# Patient Record
Sex: MALE | Race: WHITE | NOT HISPANIC OR LATINO | Employment: FULL TIME | URBAN - METROPOLITAN AREA
[De-identification: names, ages, dates, MRNs, and addresses within clinical notes are randomized per-mention and may not be internally consistent; named-entity substitution may affect disease eponyms.]

---

## 2023-10-15 ENCOUNTER — HOSPITAL ENCOUNTER (EMERGENCY)
Facility: CLINIC | Age: 23
Discharge: HOME | End: 2023-10-15
Attending: EMERGENCY MEDICINE | Admitting: EMERGENCY MEDICINE
Payer: COMMERCIAL

## 2023-10-15 VITALS
DIASTOLIC BLOOD PRESSURE: 81 MMHG | OXYGEN SATURATION: 100 % | SYSTOLIC BLOOD PRESSURE: 124 MMHG | RESPIRATION RATE: 16 BRPM | HEART RATE: 90 BPM | TEMPERATURE: 98 F | WEIGHT: 195 LBS

## 2023-10-15 DIAGNOSIS — S01.81XA FACIAL LACERATION, INITIAL ENCOUNTER: ICD-10-CM

## 2023-10-15 DIAGNOSIS — S01.85XA DOG BITE OF FACE, INITIAL ENCOUNTER: ICD-10-CM
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
## 2023-10-15 PROCEDURE — 12052 INTMD RPR FACE/MM 2.6-5.0 CM: CPT | Performed by: EMERGENCY MEDICINE

## 2023-10-15 PROCEDURE — 99283 EMERGENCY DEPT VISIT LOW MDM: CPT | Mod: 25 | Performed by: EMERGENCY MEDICINE

## 2023-10-15 ASSESSMENT — ACTIVITIES OF DAILY LIVING (ADL): ADLS_ACUITY_SCORE: 35

## 2023-10-15 NOTE — ED TRIAGE NOTES
He was teasing his dog around it's food dish and it bit him on the L temple. It is a irregular 2 cm diameter cut and it's not bleeding

## 2023-10-15 NOTE — ED PROVIDER NOTES
History     Chief Complaint   Patient presents with    Dog Bite     HPI  Blake Cárdenas is a 23 year old male who presents to the emergency room for concern of dog bite.  Said that he was teasing the family dog and was playing around its food dish this morning when the dog bit him in the left temple.  He said that he does not have much pain.  Has an irregular cut on his left temple.  Bleeding is controlled.  Says that he is up-to-date on vaccinations, but no records in EMR.  The dog is up-to-date on vaccinations    Allergies:  No Known Allergies    Problem List:    There are no problems to display for this patient.       Past Medical History:    No past medical history on file.    Past Surgical History:    No past surgical history on file.    Family History:    No family history on file.    Social History:  Marital Status:  Single [1]        Medications:    amoxicillin-clavulanate (AUGMENTIN) 875-125 MG tablet          Review of Systems   All other systems reviewed and are negative.      Physical Exam   BP: 127/84  Pulse: 91  Temp: 98  F (36.7  C)  Resp: 18  Weight: 88.5 kg (195 lb)  SpO2: 100 %      Physical Exam  Vitals and nursing note reviewed.   Constitutional:       General: He is not in acute distress.  HENT:      Head: Normocephalic.      Nose: Nose normal.   Eyes:      Extraocular Movements: Extraocular movements intact.      Conjunctiva/sclera: Conjunctivae normal.      Pupils: Pupils are equal, round, and reactive to light.   Musculoskeletal:      Cervical back: Normal range of motion.   Skin:     Comments: See photo below   Neurological:      Mental Status: He is alert.             ED [facility]    -Laceration Repair    Date/Time: 10/15/2023 9:45 AM    Performed by: Grace Muñiz DO  Authorized by: Grace Muñiz DO    Risks, benefits and alternatives discussed.      ANESTHESIA (see MAR for exact dosages):     Anesthesia  method:  Local infiltration    Local anesthetic:  Bupivacaine 0.5% w/o epi  LACERATION DETAILS     Location:  Face    Length (cm):  4    Depth (mm):  2    REPAIR TYPE:     Repair type:  Complex    EXPLORATION:     Limited defect created (wound extended): yes      Hemostasis achieved with:  Direct pressure    TREATMENT:     Amount of cleaning:  Extensive    Irrigation solution:  Sterile saline    Irrigation method:  Syringe and pressure wash    Undermining:  Minimal    Scar revision: yes      FASCIA REPAIR     Suture size:  4-0    Suture material:  Vicryl    Suture technique:  Simple interrupted    Number of sutures:  5    SKIN REPAIR     Repair method:  Sutures    Suture size:  6-0    Suture material:  Prolene    Suture technique:  Simple interrupted    Number of sutures:  7    APPROXIMATION     Approximation:  Close    POST-PROCEDURE DETAILS     Dressing:  Antibiotic ointment and adhesive bandage      PROCEDURE    Patient Tolerance:  Patient tolerated the procedure well with no immediate complications                Critical Care time:  none               No results found for this or any previous visit (from the past 24 hour(s)).    Medications - No data to display    Assessments & Plan (with Medical Decision Making)  Blake is a 23-year-old male presenting to the emergency room over concern of dog bite to the face.  See history focused physical exam as above  Pleasant 23-year-old male in no acute distress, is vitally stable and afebrile.  He is up-to-date on childhood vaccinations, and should be within acceptable range for having received complete tetanus series.  Dog is a family pet and is up-to-date on vaccinations, no concern for rabies.  This was provoked bite.  RN reported to the [facility].  Discussed plan for repair, this will be more complex since this appears to be avulsed skin and may need to extend wound edges so that it will cleanly come together, and may need to do some undermining to improve scarring  and remove tension.  He is agreeable to this plan.  Local anesthetic was infiltrated to allow for more comfort during irrigation  RN copiously irrigated, washed the wound with soap and water, and irrigated again.  Wound was then prepped and draped in the usual sterile fashion.  Wound margins were extended with an elliptical incision.  Undermining to reduce tension on the wound and inverted interrupted Vicryl sutures were placed to reduce tension and closer approximate edges.  Then using Ethilon sutures, closely approximated wound edges.  See procedure note above for details.  Patient tolerated this well and wound was well approximated.  Bleeding was controlled.  Had a long discussion with patient and his father who is at the bedside for duration of procedure the necessary wound care and when to have sutures removed.  Father states that he has had numerous sutures over the years and feels comfortable removing them himself.  Did advise that animal bites have a high chance of becoming infected, and since patient will be on antibiotic and this is on his face, would recommend a primary doctor remove the sutures so they can take a second look, but if they feel comfortable and do not notice any sign of infection, they could try to remove sutures at home.  They were provided with the forceps and scissors to do so.  Wound care instructions were provided.  Antibiotic was sent to the pharmacy.  All questions were answered.  Discharged in stable condition     I have reviewed the nursing notes.    I have reviewed the findings, diagnosis, plan and need for follow up with the patient.           Medical Decision Making  The patient's presentation was of moderate complexity (an acute complicated injury).    The patient's evaluation involved:  history and exam without other MDM data elements    The patient's management necessitated moderate risk (prescription drug management including medications given in the ED) and moderate risk (a  decision regarding minor procedure (laceration repair) with risk factors of high risk features of the procedure site ).        Discharge Medication List as of 10/15/2023 11:27 AM        START taking these medications    Details   amoxicillin-clavulanate (AUGMENTIN) 875-125 MG tablet Take 1 tablet by mouth 2 times daily, Disp-20 tablet, R-0, E-Prescribe             Final diagnoses:   Dog bite of face, initial encounter   Facial laceration, initial encounter       10/15/2023   [facility] EMERGENCY DEPT       Grace Muñiz DO  10/15/23 0246

## 2023-10-15 NOTE — DISCHARGE INSTRUCTIONS
The wound from a dog bite had to be revised to improve scarring outcome.  You had 5 dissolvable stitches placed under your skin, and 7 stitches placed on top of your skin.  The 7 stitches on the outside will need to be removed in approximately 1 week.  You should follow-up with your primary doctor in clinic for evaluation of wound healing and to have the sutures removed.  However, if you are unable to see your provider and you feel comfortable removing the stitches yourself, you may use the tools provided to carefully step the sutures next to the knot and remove    Keep the bandage on for the next 24 hours.  After that, you remove and replace it at least once daily or if it becomes dirty or soiled.  May apply antibiotic ointment over the top to help it heal and keep it protected    After 24 hours, it is okay if wound gets wet while you are in the shower.  Okay of soap or water runs over it, but do not scrub so the sutures do not loosen.  Pat dry carefully    Start the antibiotic today.  Antibiotic is necessary to prevent infection from an animal bite    If you develop any signs of infection, such as pus draining from the wound, redness running around the area, fever, or any new concerns, do not hesitate to return to the emergency room for evaluation

## 2023-10-15 NOTE — ED NOTES
Call placed to [facility]'s office to report the dog bite. Information given to dispatch.

## 2023-10-15 NOTE — ED NOTES
Call received from deputy Barillas from [facility] and he will follow up with this patient later on this afternoon at his home